# Patient Record
Sex: MALE | Race: WHITE
[De-identification: names, ages, dates, MRNs, and addresses within clinical notes are randomized per-mention and may not be internally consistent; named-entity substitution may affect disease eponyms.]

---

## 2018-03-14 ENCOUNTER — HOSPITAL ENCOUNTER (EMERGENCY)
Dept: HOSPITAL 63 - ER | Age: 38
Discharge: HOME | End: 2018-03-14
Payer: SELF-PAY

## 2018-03-14 VITALS — SYSTOLIC BLOOD PRESSURE: 118 MMHG | DIASTOLIC BLOOD PRESSURE: 70 MMHG

## 2018-03-14 VITALS — HEIGHT: 69 IN | WEIGHT: 180 LBS | BODY MASS INDEX: 26.66 KG/M2
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
## 2018-03-14 DIAGNOSIS — T73.3XXA: Primary | ICD-10-CM

## 2018-03-14 LAB
ALBUMIN SERPL-MCNC: 3.3 G/DL (ref 3.4–5)
ALBUMIN/GLOB SERPL: 1 {RATIO} (ref 1–1.7)
ALP SERPL-CCNC: 58 U/L (ref 46–116)
ALT SERPL-CCNC: 32 U/L (ref 16–63)
AMPHETAMINE/METHAMPHETAMINE: (no result)
ANION GAP SERPL CALC-SCNC: 8 MMOL/L (ref 6–14)
APTT PPP: YELLOW S
AST SERPL-CCNC: 24 U/L (ref 15–37)
BACTERIA #/AREA URNS HPF: 0 /HPF
BARBITURATES UR-MCNC: (no result) UG/ML
BASOPHILS # BLD AUTO: 0.1 X10^3/UL (ref 0–0.2)
BASOPHILS NFR BLD: 1 % (ref 0–3)
BENZODIAZ UR-MCNC: (no result) UG/L
BILIRUB SERPL-MCNC: 0.4 MG/DL (ref 0.2–1)
BILIRUB UR QL STRIP: (no result)
BUN/CREAT SERPL: 13 (ref 6–20)
CA-I SERPL ISE-MCNC: 13 MG/DL (ref 8–26)
CALCIUM SERPL-MCNC: 9.1 MG/DL (ref 8.5–10.1)
CANNABINOIDS UR-MCNC: (no result) UG/L
CHLORIDE SERPL-SCNC: 105 MMOL/L (ref 98–107)
CO2 SERPL-SCNC: 29 MMOL/L (ref 21–32)
COCAINE UR-MCNC: (no result) NG/ML
CREAT SERPL-MCNC: 1 MG/DL (ref 0.7–1.3)
EOSINOPHIL NFR BLD: 0.4 X10^3/UL (ref 0–0.7)
EOSINOPHIL NFR BLD: 5 % (ref 0–3)
ERYTHROCYTE [DISTWIDTH] IN BLOOD BY AUTOMATED COUNT: 14.1 % (ref 11.5–14.5)
ETHANOL SERPL-MCNC: < 10 MG/DL (ref 0–10)
FIBRINOGEN PPP-MCNC: CLEAR MG/DL
GFR SERPLBLD BASED ON 1.73 SQ M-ARVRAT: 85 ML/MIN
GLOBULIN SER-MCNC: 3.2 G/DL (ref 2.2–3.8)
GLUCOSE SERPL-MCNC: 126 MG/DL (ref 70–99)
GLUCOSE UR STRIP-MCNC: (no result) MG/DL
HCT VFR BLD CALC: 39.6 % (ref 39–53)
HGB BLD-MCNC: 13.6 G/DL (ref 13–17.5)
LYMPHOCYTES # BLD: 2.7 X10^3/UL (ref 1–4.8)
LYMPHOCYTES NFR BLD AUTO: 30 % (ref 24–48)
MCH RBC QN AUTO: 31 PG (ref 25–35)
MCHC RBC AUTO-ENTMCNC: 34 G/DL (ref 31–37)
MCV RBC AUTO: 89 FL (ref 79–100)
METHADONE SERPL-MCNC: (no result) NG/ML
MONO #: 0.9 X10^3/UL (ref 0–1.1)
MONOCYTES NFR BLD: 11 % (ref 0–9)
NEUT #: 4.7 X10^3UL (ref 1.8–7.7)
NEUTROPHILS NFR BLD AUTO: 54 % (ref 31–73)
NITRITE UR QL STRIP: (no result)
OPIATES UR-MCNC: (no result) NG/ML
PCP SERPL-MCNC: (no result) MG/DL
PLATELET # BLD AUTO: 313 X10^3/UL (ref 140–400)
POTASSIUM SERPL-SCNC: 3.2 MMOL/L (ref 3.5–5.1)
PROT SERPL-MCNC: 6.5 G/DL (ref 6.4–8.2)
RBC # BLD AUTO: 4.46 X10^6/UL (ref 4.3–5.7)
RBC #/AREA URNS HPF: (no result) /HPF (ref 0–2)
SALIC: 3.4 MG/DL (ref 2.8–20)
SODIUM SERPL-SCNC: 142 MMOL/L (ref 136–145)
SP GR UR STRIP: >=1.03
SQUAMOUS #/AREA URNS LPF: (no result) /LPF
UROBILINOGEN UR-MCNC: 2 MG/DL
WBC # BLD AUTO: 8.8 X10^3/UL (ref 4–11)
WBC #/AREA URNS HPF: (no result) /HPF (ref 0–4)

## 2018-03-14 PROCEDURE — 81001 URINALYSIS AUTO W/SCOPE: CPT

## 2018-03-14 PROCEDURE — 80307 DRUG TEST PRSMV CHEM ANLYZR: CPT

## 2018-03-14 PROCEDURE — G0479 DRUG TEST PRESUMP NOT OPT: HCPCS

## 2018-03-14 PROCEDURE — 96360 HYDRATION IV INFUSION INIT: CPT

## 2018-03-14 PROCEDURE — 93005 ELECTROCARDIOGRAM TRACING: CPT

## 2018-03-14 PROCEDURE — 80053 COMPREHEN METABOLIC PANEL: CPT

## 2018-03-14 PROCEDURE — 99285 EMERGENCY DEPT VISIT HI MDM: CPT

## 2018-03-14 PROCEDURE — 36415 COLL VENOUS BLD VENIPUNCTURE: CPT

## 2018-03-14 PROCEDURE — 85025 COMPLETE CBC W/AUTO DIFF WBC: CPT

## 2018-03-14 PROCEDURE — G0480 DRUG TEST DEF 1-7 CLASSES: HCPCS

## 2018-03-14 NOTE — PHYS DOC
Adult General


Chief Complaint


Chief Complaint:  ALTERED MENTAL STATUS





HPI


HPI





Patient is a 37-year-old gentleman who was brought in by EMS today secondary to 

being found unresponsive in the bathroom at his work release program. Upon 

arrival to the ER the patient was unresponsive to verbal stimuli and minimally 

responsive to painful similar. Patient at approximately 10:30 PM reports that 

he is currently alert awake and oriented 3. Patient denies any other 

symptomology. 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 

secondary to working construction. Patient denies any fevers shakes chills 

nausea vomiting diarrhea chest pain terns of breath cough cold or rhinorrhea. 

Patient has any alcohol or drug use.





Review of systems:


Constitutional: Denies fever or chills 


Eyes: Denies change in visual acuity, redness, or eye pain 


HENT: Denies nasal congestion or sore throat 


Respiratory: Denies cough or shortness of breath 





All other systems were reviewed and found to be within normal limits, except as 

documented in this note.





Physical exam: 


Constitutional: Well developed, well nourished, no acute distress, non-toxic 

appearance. 


HENT: Normocephalic, atraumatic, bilateral external ears normal,  nose normal. 


Eyes: PERRLA, EOMI, conjunctiva normal, no discharge.  


Neck: Normal range of motion, no tenderness, supple, no stridor.  


Cardiovascular: Heart rate regular rhythm,


Lungs & Thorax:  Bilateral breath sounds clear to auscultation 


Abdomen: No abdominal distention.


Skin: Warm, dry, no erythema, no rash.  


Back: Normal spinal curvature


Extremities: No tenderness, no cyanosis, no clubbing, ROM intact, no edema.  


Neurologic: Alert and oriented X 3, normal motor function, normal sensory 

function, no focal deficits noted. 


Psychologic: Affect normal, judgement normal, mood normal. 





Patient's ER physical exam was most remarkable:





EKG as interpreted by ER physician reveals: Normal sinus rhythm with 

nonspecific ST-T wave abnormalities. One PVC. No evidence of ST elevation MI as 

interpreted by ER physician.





Chest x-ray as interpreted by ER physician reveals:


Labs reviewed: CBC, CMP, UDS: All normal.





Assessment and plan:


1.  37-year-old gentleman who presents here today secondary to altered mental 

status. Patient initially presents to the ER and was unresponsive to painful 

and verbal stimuli. Patient fully catheter placed without any response. Patient'

s gag reflex was intact upon arrival. Patient's pupils were 4 mm and reactive 

to light. Upon reevaluation the patient at approximately 10:30 PM the patient 

had a positive gag and became alert awake somewhat agitated at the gag reflex. 

Patient appeared initially disoriented however when she woke up at 10:30 PM he 

was alert awake and oriented 3. Patient was appropriate and pleasant and in no 

acute distress. Patient denies any drug use and reports he has a patch in his 

right shoulder where the  checks for any drug use. Patient at 

this time reported that he barely slept and he thinks that he would like that 

because he was extremely tired. Patient's urine drug screen was negative for 

any drug use. Patient's levels negative. Patient's labs were all within normal 

limits. Patient's currently alert awake oriented 3.all his family and the 

phone and ambulating the ED without any distress. Patient appears extremely 

appropriate and stable for discharged home at this time.





Current Medications


Current Medications





Current Medications








 Medications


  (Trade)  Dose


 Ordered  Sig/John  Start Time


 Stop Time Status Last Admin


Dose Admin


 


 Sodium Chloride  1,000 ml @ 


 1,000 mls/hr  1X  ONCE  3/14/18 22:45


 3/14/18 23:44  3/14/18 22:00


1,000 MLS/HR











Allergies


Allergies





Allergies








Coded Allergies Type Severity Reaction Last Updated Verified


 


  No Known Drug Allergies    3/14/18 No











Current Patient Data


Lab Results





 Laboratory Tests








Test


  3/14/18


21:42 3/14/18


21:45


 


Urine Collection Type U cath   


 


Urine Color Yellow   


 


Urine Clarity Clear   


 


Urine pH 6.0   


 


Urine Specific Gravity >=1.030   


 


Urine Protein


  30 mg/dl


(NEG-TRACE) 


 


 


Urine Glucose (UA)


  Neg mg/dL


(NEG) 


 


 


Urine Ketones (Stick)


  15 mg/dL (NEG)


  


 


 


Urine Blood Neg (NEG)   


 


Urine Nitrite Neg (NEG)   


 


Urine Bilirubin Neg (NEG)   


 


Urine Urobilinogen Dipstick


  2 mg/dL (0.2


mg/dL) 


 


 


Urine Leukocyte Esterase Neg (NEG)   


 


Urine RBC


  3-5 /HPF (0-2)


  


 


 


Urine WBC


  1-4 /HPF (0-4)


  


 


 


Urine Squamous Epithelial


Cells Few /LPF  


  


 


 


Urine Bacteria


  0 /HPF (0-FEW)


  


 


 


Urine Mucus Marked /LPF   


 


Urine Opiates Screen Neg (NEG)   


 


Urine Methadone Screen Neg (NEG)   


 


Urine Barbiturates Neg (NEG)   


 


Urine Phencyclidine Screen Neg (NEG)   


 


Urine


Amphetamine/Methamphetamine Neg (NEG)  


  


 


 


Urine Benzodiazepines Screen Neg (NEG)   


 


Urine Cocaine Screen Neg (NEG)   


 


Urine Cannabinoids Screen Neg (NEG)   


 


Urine Ethyl Alcohol Neg (NEG)   


 


White Blood Count


  


  8.8 x10^3/uL


(4.0-11.0)


 


Red Blood Count


  


  4.46 x10^6/uL


(4.30-5.70)


 


Hemoglobin


  


  13.6 g/dL


(13.0-17.5)


 


Hematocrit


  


  39.6 %


(39.0-53.0)


 


Mean Corpuscular Volume


  


  89 fL ()


 


 


Mean Corpuscular Hemoglobin  31 pg (25-35)  


 


Mean Corpuscular Hemoglobin


Concent 


  34 g/dL


(31-37)


 


Red Cell Distribution Width


  


  14.1 %


(11.5-14.5)


 


Platelet Count


  


  313 x10^3/uL


(140-400)


 


Neutrophils (%) (Auto)  54 % (31-73)  


 


Lymphocytes (%) (Auto)  30 % (24-48)  


 


Monocytes (%) (Auto)  11 % (0-9)  H


 


Eosinophils (%) (Auto)  5 % (0-3)  H


 


Basophils (%) (Auto)  1 % (0-3)  


 


Neutrophils # (Auto)


  


  4.7 x10^3uL


(1.8-7.7)


 


Lymphocytes # (Auto)


  


  2.7 x10^3/uL


(1.0-4.8)


 


Monocytes # (Auto)


  


  0.9 x10^3/uL


(0.0-1.1)


 


Eosinophils # (Auto)


  


  0.4 x10^3/uL


(0.0-0.7)


 


Basophils # (Auto)


  


  0.1 x10^3/uL


(0.0-0.2)


 


Sodium Level


  


  142 mmol/L


(136-145)


 


Potassium Level


  


  3.2 mmol/L


(3.5-5.1)  L


 


Chloride Level


  


  105 mmol/L


()


 


Carbon Dioxide Level


  


  29 mmol/L


(21-32)


 


Anion Gap  8 (6-14)  


 


Blood Urea Nitrogen


  


  13 mg/dL


(8-26)


 


Creatinine


  


  1.0 mg/dL


(0.7-1.3)


 


Estimated GFR


(Cockcroft-Gault) 


  85.0  


 


 


BUN/Creatinine Ratio  13 (6-20)  


 


Glucose Level


  


  126 mg/dL


(70-99)  H


 


Calcium Level


  


  9.1 mg/dL


(8.5-10.1)


 


Total Bilirubin


  


  0.4 mg/dL


(0.2-1.0)


 


Aspartate Amino Transferase


(AST) 


  24 U/L (15-37)


 


 


Alanine Aminotransferase (ALT)


  


  32 U/L (16-63)


 


 


Alkaline Phosphatase


  


  58 U/L


()


 


Total Protein


  


  6.5 g/dL


(6.4-8.2)


 


Albumin


  


  3.3 g/dL


(3.4-5.0)  L


 


Albumin/Globulin Ratio  1.0 (1.0-1.7)  


 


Salicylates Level


  


  3.4 mg/dL


(2.8-20.0)


 


Salicylate Last Dose Date  Unknown  


 


Salicylate Last Dose Time  Unknown  


 


Ethyl Alcohol Level


  


  < 10 mg/dL


(0-10)











EKG


EKG


[]





Radiology/Procedures


Radiology/Procedures


[]





Course & Med Decision Making


Course & Med Decision Making


Pertinent Labs and Imaging studies reviewed. (See chart for details)





[]





Dragon Disclaimer


Dragon Disclaimer


This electronic medical record was generated, in whole or in part, using a 

voice recognition dictation system.





Departure


Departure:


Impression:  


 Primary Impression:  


 Exhaustion due to overexertion


 Additional Impression:  


 Unresponsive


Disposition:  01 HOME, SELF-CARE


Condition:  IMPROVED


Referrals:  


PCP,NO (PCP)


Patient Instructions:  Medical Screening Exam, Stress Management





Problem Qualifiers











HARI JIMÉNEZ MD Mar 14, 2018 23:17

## 2018-03-15 NOTE — EKG
[facility] [address], [place] [address]

Test Date:    2018               Test Time:    21:48:02

Pat Name:     GARRICK HUSSEIN            Department:   

Patient ID:   [id number]           Room:          

Gender:       M                        Technician:   DINH

:          1980               Requested By: HARI JIMÉNEZ

Order Number: [id number]            Reading MD:     

                                 Measurements

Intervals                              Axis          

Rate:         77                       P:            61

ND:           168                      QRS:          89

QRSD:         96                       T:            40

QT:           362                                    

QTc:          411                                    

                           Interpretive Statements

SINUS RHYTHM

VENTRICULAR PREMATURE COMPLEX(ES)

ABNORMAL ECG

RI6.01

No previous ECG available for comparison